# Patient Record
Sex: FEMALE | Race: BLACK OR AFRICAN AMERICAN | NOT HISPANIC OR LATINO
[De-identification: names, ages, dates, MRNs, and addresses within clinical notes are randomized per-mention and may not be internally consistent; named-entity substitution may affect disease eponyms.]

---

## 2018-04-18 ENCOUNTER — LABORATORY RESULT (OUTPATIENT)
Age: 28
End: 2018-04-18

## 2018-04-18 ENCOUNTER — OUTPATIENT (OUTPATIENT)
Dept: OUTPATIENT SERVICES | Facility: HOSPITAL | Age: 28
LOS: 1 days | End: 2018-04-18
Payer: MEDICAID

## 2018-04-18 ENCOUNTER — APPOINTMENT (OUTPATIENT)
Dept: OBGYN | Facility: HOSPITAL | Age: 28
End: 2018-04-18

## 2018-04-18 ENCOUNTER — RESULT REVIEW (OUTPATIENT)
Age: 28
End: 2018-04-18

## 2018-04-18 VITALS
HEIGHT: 63 IN | DIASTOLIC BLOOD PRESSURE: 65 MMHG | SYSTOLIC BLOOD PRESSURE: 101 MMHG | HEART RATE: 67 BPM | BODY MASS INDEX: 24.27 KG/M2 | WEIGHT: 137 LBS

## 2018-04-18 DIAGNOSIS — Z30.09 ENCOUNTER FOR OTHER GENERAL COUNSELING AND ADVICE ON CONTRACEPTION: ICD-10-CM

## 2018-04-18 DIAGNOSIS — Z78.9 OTHER SPECIFIED HEALTH STATUS: ICD-10-CM

## 2018-04-18 DIAGNOSIS — Z86.19 PERSONAL HISTORY OF OTHER INFECTIOUS AND PARASITIC DISEASES: ICD-10-CM

## 2018-04-18 DIAGNOSIS — N76.0 ACUTE VAGINITIS: ICD-10-CM

## 2018-04-18 DIAGNOSIS — Z01.419 ENCOUNTER FOR GYNECOLOGICAL EXAMINATION (GENERAL) (ROUTINE) WITHOUT ABNORMAL FINDINGS: ICD-10-CM

## 2018-04-18 DIAGNOSIS — Z63.5 DISRUPTION OF FAMILY BY SEPARATION AND DIVORCE: ICD-10-CM

## 2018-04-18 DIAGNOSIS — Z83.2 FAMILY HISTORY OF DISEASES OF THE BLOOD AND BLOOD-FORMING ORGANS AND CERTAIN DISORDERS INVOLVING THE IMMUNE MECHANISM: ICD-10-CM

## 2018-04-18 LAB
ALBUMIN SERPL ELPH-MCNC: 4.3 G/DL — SIGNIFICANT CHANGE UP (ref 3.3–5)
ALP SERPL-CCNC: 67 U/L — SIGNIFICANT CHANGE UP (ref 40–120)
ALT FLD-CCNC: 9 U/L — SIGNIFICANT CHANGE UP (ref 4–33)
AST SERPL-CCNC: 13 U/L — SIGNIFICANT CHANGE UP (ref 4–32)
BASOPHILS # BLD AUTO: 0.02 K/UL — SIGNIFICANT CHANGE UP (ref 0–0.2)
BASOPHILS NFR BLD AUTO: 0.4 % — SIGNIFICANT CHANGE UP (ref 0–2)
BILIRUB SERPL-MCNC: < 0.2 MG/DL — LOW (ref 0.2–1.2)
BUN SERPL-MCNC: 15 MG/DL — SIGNIFICANT CHANGE UP (ref 7–23)
CALCIUM SERPL-MCNC: 9.2 MG/DL — SIGNIFICANT CHANGE UP (ref 8.4–10.5)
CHLORIDE SERPL-SCNC: 104 MMOL/L — SIGNIFICANT CHANGE UP (ref 98–107)
CO2 SERPL-SCNC: 26 MMOL/L — SIGNIFICANT CHANGE UP (ref 22–31)
CREAT SERPL-MCNC: 0.65 MG/DL — SIGNIFICANT CHANGE UP (ref 0.5–1.3)
EOSINOPHIL # BLD AUTO: 0.14 K/UL — SIGNIFICANT CHANGE UP (ref 0–0.5)
EOSINOPHIL NFR BLD AUTO: 2.6 % — SIGNIFICANT CHANGE UP (ref 0–6)
GLUCOSE SERPL-MCNC: 86 MG/DL — SIGNIFICANT CHANGE UP (ref 70–99)
HCT VFR BLD CALC: 35.4 % — SIGNIFICANT CHANGE UP (ref 34.5–45)
HGB BLD-MCNC: 11.4 G/DL — LOW (ref 11.5–15.5)
HIV 1+2 AB+HIV1 P24 AG SERPL QL IA: SIGNIFICANT CHANGE UP
IMM GRANULOCYTES # BLD AUTO: 0 # — SIGNIFICANT CHANGE UP
IMM GRANULOCYTES NFR BLD AUTO: 0 % — SIGNIFICANT CHANGE UP (ref 0–1.5)
LYMPHOCYTES # BLD AUTO: 2.5 K/UL — SIGNIFICANT CHANGE UP (ref 1–3.3)
LYMPHOCYTES # BLD AUTO: 46.9 % — HIGH (ref 13–44)
MCHC RBC-ENTMCNC: 28.1 PG — SIGNIFICANT CHANGE UP (ref 27–34)
MCHC RBC-ENTMCNC: 32.2 % — SIGNIFICANT CHANGE UP (ref 32–36)
MCV RBC AUTO: 87.2 FL — SIGNIFICANT CHANGE UP (ref 80–100)
MONOCYTES # BLD AUTO: 0.32 K/UL — SIGNIFICANT CHANGE UP (ref 0–0.9)
MONOCYTES NFR BLD AUTO: 6 % — SIGNIFICANT CHANGE UP (ref 2–14)
NEUTROPHILS # BLD AUTO: 2.35 K/UL — SIGNIFICANT CHANGE UP (ref 1.8–7.4)
NEUTROPHILS NFR BLD AUTO: 44.1 % — SIGNIFICANT CHANGE UP (ref 43–77)
NRBC # FLD: 0 — SIGNIFICANT CHANGE UP
PLATELET # BLD AUTO: 201 K/UL — SIGNIFICANT CHANGE UP (ref 150–400)
PMV BLD: 11.1 FL — SIGNIFICANT CHANGE UP (ref 7–13)
POTASSIUM SERPL-MCNC: 4 MMOL/L — SIGNIFICANT CHANGE UP (ref 3.5–5.3)
POTASSIUM SERPL-SCNC: 4 MMOL/L — SIGNIFICANT CHANGE UP (ref 3.5–5.3)
PROT SERPL-MCNC: 7.6 G/DL — SIGNIFICANT CHANGE UP (ref 6–8.3)
RBC # BLD: 4.06 M/UL — SIGNIFICANT CHANGE UP (ref 3.8–5.2)
RBC # FLD: 12.5 % — SIGNIFICANT CHANGE UP (ref 10.3–14.5)
SODIUM SERPL-SCNC: 141 MMOL/L — SIGNIFICANT CHANGE UP (ref 135–145)
WBC # BLD: 5.33 K/UL — SIGNIFICANT CHANGE UP (ref 3.8–10.5)
WBC # FLD AUTO: 5.33 K/UL — SIGNIFICANT CHANGE UP (ref 3.8–10.5)

## 2018-04-18 PROCEDURE — 88141 CYTOPATH C/V INTERPRET: CPT

## 2018-04-18 SDOH — SOCIAL STABILITY - SOCIAL INSECURITY: DISRUPTION OF FAMILY BY SEPARATION AND DIVORCE: Z63.5

## 2018-04-19 LAB
HBV SURFACE AG SER-ACNC: NONREACTIVE — SIGNIFICANT CHANGE UP
HCV AB S/CO SERPL IA: 0.49 S/CO — SIGNIFICANT CHANGE UP
HCV AB SERPL-IMP: SIGNIFICANT CHANGE UP
T PALLIDUM AB TITR SER: NEGATIVE — SIGNIFICANT CHANGE UP

## 2018-04-20 LAB
C TRACH RRNA SPEC QL NAA+PROBE: SIGNIFICANT CHANGE UP
CANDIDA AB TITR SER: NOT DETECTED — SIGNIFICANT CHANGE UP
G VAGINALIS DNA SPEC QL NAA+PROBE: DETECTED — SIGNIFICANT CHANGE UP
N GONORRHOEA RRNA SPEC QL NAA+PROBE: SIGNIFICANT CHANGE UP
SPECIMEN SOURCE: SIGNIFICANT CHANGE UP
T VAGINALIS SPEC QL WET PREP: NOT DETECTED — SIGNIFICANT CHANGE UP

## 2018-04-23 ENCOUNTER — TRANSCRIPTION ENCOUNTER (OUTPATIENT)
Age: 28
End: 2018-04-23

## 2018-04-24 ENCOUNTER — OUTPATIENT (OUTPATIENT)
Dept: OUTPATIENT SERVICES | Facility: HOSPITAL | Age: 28
LOS: 1 days | End: 2018-04-24

## 2018-04-24 ENCOUNTER — APPOINTMENT (OUTPATIENT)
Dept: OBGYN | Facility: HOSPITAL | Age: 28
End: 2018-04-24

## 2018-04-24 VITALS
DIASTOLIC BLOOD PRESSURE: 74 MMHG | HEART RATE: 83 BPM | WEIGHT: 138 LBS | SYSTOLIC BLOOD PRESSURE: 108 MMHG | HEIGHT: 64 IN | BODY MASS INDEX: 23.56 KG/M2

## 2018-04-24 DIAGNOSIS — L81.9 DISORDER OF PIGMENTATION, UNSPECIFIED: ICD-10-CM

## 2018-04-24 RX ORDER — MEDROXYPROGESTERONE ACETATE 150 MG/ML
150 INJECTION, SUSPENSION INTRAMUSCULAR
Qty: 0 | Refills: 0 | Status: COMPLETED | OUTPATIENT
Start: 2018-04-24

## 2018-04-24 RX ADMIN — MEDROXYPROGESTERONE ACETATE 0 MG/ML: 150 INJECTION, SUSPENSION INTRAMUSCULAR at 00:00

## 2018-04-25 DIAGNOSIS — Z30.42 ENCOUNTER FOR SURVEILLANCE OF INJECTABLE CONTRACEPTIVE: ICD-10-CM

## 2018-04-25 DIAGNOSIS — Z30.46 ENCOUNTER FOR SURVEILLANCE OF IMPLANTABLE SUBDERMAL CONTRACEPTIVE: ICD-10-CM

## 2018-04-25 DIAGNOSIS — L81.9 DISORDER OF PIGMENTATION, UNSPECIFIED: ICD-10-CM

## 2018-05-02 ENCOUNTER — LABORATORY RESULT (OUTPATIENT)
Age: 28
End: 2018-05-02

## 2018-05-02 ENCOUNTER — APPOINTMENT (OUTPATIENT)
Dept: OBGYN | Facility: HOSPITAL | Age: 28
End: 2018-05-02

## 2018-05-02 ENCOUNTER — OUTPATIENT (OUTPATIENT)
Dept: OUTPATIENT SERVICES | Facility: HOSPITAL | Age: 28
LOS: 1 days | End: 2018-05-02

## 2018-05-02 VITALS
DIASTOLIC BLOOD PRESSURE: 78 MMHG | SYSTOLIC BLOOD PRESSURE: 115 MMHG | WEIGHT: 138 LBS | HEART RATE: 78 BPM | HEIGHT: 64 IN | BODY MASS INDEX: 23.56 KG/M2

## 2018-05-02 DIAGNOSIS — L29.8 OTHER PRURITUS: ICD-10-CM

## 2018-05-03 ENCOUNTER — RESULT CHARGE (OUTPATIENT)
Age: 28
End: 2018-05-03

## 2018-05-03 ENCOUNTER — RESULT REVIEW (OUTPATIENT)
Age: 28
End: 2018-05-03

## 2018-05-03 ENCOUNTER — OUTPATIENT (OUTPATIENT)
Dept: OUTPATIENT SERVICES | Facility: HOSPITAL | Age: 28
LOS: 1 days | End: 2018-05-03
Payer: MEDICAID

## 2018-05-03 ENCOUNTER — APPOINTMENT (OUTPATIENT)
Dept: OBGYN | Facility: HOSPITAL | Age: 28
End: 2018-05-03
Payer: MEDICAID

## 2018-05-03 VITALS
HEIGHT: 64 IN | WEIGHT: 137.35 LBS | SYSTOLIC BLOOD PRESSURE: 112 MMHG | DIASTOLIC BLOOD PRESSURE: 72 MMHG | BODY MASS INDEX: 23.45 KG/M2 | HEART RATE: 68 BPM

## 2018-05-03 DIAGNOSIS — N87.9 DYSPLASIA OF CERVIX UTERI, UNSPECIFIED: ICD-10-CM

## 2018-05-03 DIAGNOSIS — Z30.46 ENCOUNTER FOR SURVEILLANCE OF IMPLANTABLE SUBDERMAL CONTRACEPTIVE: ICD-10-CM

## 2018-05-03 DIAGNOSIS — L29.8 OTHER PRURITUS: ICD-10-CM

## 2018-05-03 LAB
HCG UR QL: NEGATIVE
HCG UR QL: NEGATIVE
QUALITY CONTROL: YES
QUALITY CONTROL: YES

## 2018-05-03 PROCEDURE — 88305 TISSUE EXAM BY PATHOLOGIST: CPT | Mod: 26

## 2018-05-03 PROCEDURE — 57454 BX/CURETT OF CERVIX W/SCOPE: CPT | Mod: GC

## 2018-05-04 DIAGNOSIS — R87.610 ATYPICAL SQUAMOUS CELLS OF UNDETERMINED SIGNIFICANCE ON CYTOLOGIC SMEAR OF CERVIX (ASC-US): ICD-10-CM

## 2018-05-04 LAB
BACTERIA UR CULT: SIGNIFICANT CHANGE UP
SPECIMEN SOURCE: SIGNIFICANT CHANGE UP

## 2018-07-11 ENCOUNTER — OUTPATIENT (OUTPATIENT)
Dept: OUTPATIENT SERVICES | Facility: HOSPITAL | Age: 28
LOS: 1 days | End: 2018-07-11

## 2018-07-11 ENCOUNTER — APPOINTMENT (OUTPATIENT)
Dept: OBGYN | Facility: HOSPITAL | Age: 28
End: 2018-07-11

## 2018-07-11 VITALS
BODY MASS INDEX: 23.86 KG/M2 | SYSTOLIC BLOOD PRESSURE: 107 MMHG | WEIGHT: 139 LBS | DIASTOLIC BLOOD PRESSURE: 72 MMHG | HEART RATE: 64 BPM

## 2018-07-11 RX ORDER — MEDROXYPROGESTERONE ACETATE 150 MG/ML
150 INJECTION, SUSPENSION INTRAMUSCULAR
Qty: 0 | Refills: 0 | Status: COMPLETED | OUTPATIENT
Start: 2018-07-11

## 2018-07-11 RX ADMIN — MEDROXYPROGESTERONE ACETATE 0 MG/ML: 150 INJECTION, SUSPENSION INTRAMUSCULAR at 00:00

## 2018-07-12 DIAGNOSIS — Z30.42 ENCOUNTER FOR SURVEILLANCE OF INJECTABLE CONTRACEPTIVE: ICD-10-CM

## 2018-09-21 ENCOUNTER — APPOINTMENT (OUTPATIENT)
Dept: DERMATOLOGY | Facility: CLINIC | Age: 28
End: 2018-09-21
Payer: MEDICAID

## 2018-09-21 VITALS
DIASTOLIC BLOOD PRESSURE: 76 MMHG | BODY MASS INDEX: 23.22 KG/M2 | WEIGHT: 136 LBS | HEIGHT: 64 IN | SYSTOLIC BLOOD PRESSURE: 108 MMHG

## 2018-09-21 DIAGNOSIS — Z91.89 OTHER SPECIFIED PERSONAL RISK FACTORS, NOT ELSEWHERE CLASSIFIED: ICD-10-CM

## 2018-09-21 DIAGNOSIS — Z87.09 PERSONAL HISTORY OF OTHER DISEASES OF THE RESPIRATORY SYSTEM: ICD-10-CM

## 2018-09-21 PROCEDURE — 99203 OFFICE O/P NEW LOW 30 MIN: CPT

## 2018-09-26 ENCOUNTER — APPOINTMENT (OUTPATIENT)
Dept: OBGYN | Facility: HOSPITAL | Age: 28
End: 2018-09-26

## 2018-09-26 ENCOUNTER — OUTPATIENT (OUTPATIENT)
Dept: OUTPATIENT SERVICES | Facility: HOSPITAL | Age: 28
LOS: 1 days | End: 2018-09-26

## 2018-09-26 ENCOUNTER — MED ADMIN CHARGE (OUTPATIENT)
Age: 28
End: 2018-09-26

## 2018-09-26 VITALS
WEIGHT: 140 LBS | DIASTOLIC BLOOD PRESSURE: 74 MMHG | HEART RATE: 74 BPM | BODY MASS INDEX: 23.9 KG/M2 | SYSTOLIC BLOOD PRESSURE: 108 MMHG | HEIGHT: 64 IN

## 2018-09-26 DIAGNOSIS — Z30.46 ENCOUNTER FOR SURVEILLANCE OF IMPLANTABLE SUBDERMAL CONTRACEPTIVE: ICD-10-CM

## 2018-09-26 RX ORDER — METRONIDAZOLE 7.5 MG/G
0.75 GEL VAGINAL
Qty: 1 | Refills: 0 | Status: COMPLETED | COMMUNITY
Start: 2018-04-18 | End: 2018-04-26

## 2018-09-26 RX ORDER — MEDROXYPROGESTERONE ACETATE 150 MG/ML
150 INJECTION, SUSPENSION INTRAMUSCULAR
Qty: 0 | Refills: 0 | Status: COMPLETED | OUTPATIENT
Start: 2018-09-26

## 2018-09-26 RX ADMIN — MEDROXYPROGESTERONE ACETATE 0 MG/ML: 150 INJECTION, SUSPENSION INTRAMUSCULAR at 00:00

## 2018-09-27 DIAGNOSIS — Z30.42 ENCOUNTER FOR SURVEILLANCE OF INJECTABLE CONTRACEPTIVE: ICD-10-CM

## 2018-10-26 ENCOUNTER — APPOINTMENT (OUTPATIENT)
Dept: DERMATOLOGY | Facility: CLINIC | Age: 28
End: 2018-10-26

## 2018-11-09 ENCOUNTER — APPOINTMENT (OUTPATIENT)
Dept: DERMATOLOGY | Facility: CLINIC | Age: 28
End: 2018-11-09
Payer: MEDICAID

## 2018-11-09 VITALS — DIASTOLIC BLOOD PRESSURE: 64 MMHG | SYSTOLIC BLOOD PRESSURE: 98 MMHG

## 2018-11-09 PROCEDURE — 99213 OFFICE O/P EST LOW 20 MIN: CPT | Mod: GC

## 2018-12-20 ENCOUNTER — APPOINTMENT (OUTPATIENT)
Dept: OBGYN | Facility: HOSPITAL | Age: 28
End: 2018-12-20

## 2019-08-14 ENCOUNTER — APPOINTMENT (OUTPATIENT)
Dept: OBGYN | Facility: HOSPITAL | Age: 29
End: 2019-08-14

## 2019-08-14 ENCOUNTER — OUTPATIENT (OUTPATIENT)
Dept: OUTPATIENT SERVICES | Facility: HOSPITAL | Age: 29
LOS: 1 days | End: 2019-08-14

## 2019-08-14 VITALS
BODY MASS INDEX: 24.09 KG/M2 | HEIGHT: 64 IN | SYSTOLIC BLOOD PRESSURE: 103 MMHG | HEART RATE: 82 BPM | DIASTOLIC BLOOD PRESSURE: 56 MMHG | WEIGHT: 141.1 LBS

## 2019-08-14 LAB
HCG UR QL: POSITIVE
QUALITY CONTROL: YES

## 2019-08-16 DIAGNOSIS — Z32.01 ENCOUNTER FOR PREGNANCY TEST, RESULT POSITIVE: ICD-10-CM

## 2019-08-16 DIAGNOSIS — Z00.00 ENCOUNTER FOR GENERAL ADULT MEDICAL EXAMINATION WITHOUT ABNORMAL FINDINGS: ICD-10-CM

## 2019-09-05 ENCOUNTER — APPOINTMENT (OUTPATIENT)
Dept: OBGYN | Facility: HOSPITAL | Age: 29
End: 2019-09-05

## 2020-03-05 ENCOUNTER — APPOINTMENT (OUTPATIENT)
Dept: OBGYN | Facility: HOSPITAL | Age: 30
End: 2020-03-05

## 2020-03-05 ENCOUNTER — OUTPATIENT (OUTPATIENT)
Dept: OUTPATIENT SERVICES | Facility: HOSPITAL | Age: 30
LOS: 1 days | End: 2020-03-05

## 2020-03-05 ENCOUNTER — LABORATORY RESULT (OUTPATIENT)
Age: 30
End: 2020-03-05

## 2020-03-05 VITALS
DIASTOLIC BLOOD PRESSURE: 75 MMHG | HEIGHT: 64 IN | WEIGHT: 148 LBS | SYSTOLIC BLOOD PRESSURE: 119 MMHG | BODY MASS INDEX: 25.27 KG/M2 | HEART RATE: 75 BPM

## 2020-03-05 DIAGNOSIS — N89.8 OTHER SPECIFIED NONINFLAMMATORY DISORDERS OF VAGINA: ICD-10-CM

## 2020-03-05 LAB
HCG UR QL: NEGATIVE
QUALITY CONTROL: YES

## 2020-03-05 RX ORDER — MEDROXYPROGESTERONE ACETATE 150 MG/ML
150 INJECTION, SUSPENSION INTRAMUSCULAR
Qty: 0 | Refills: 0 | Status: COMPLETED | OUTPATIENT
Start: 2020-03-05

## 2020-03-05 RX ADMIN — MEDROXYPROGESTERONE ACETATE 0 MG/ML: 150 INJECTION, SUSPENSION, EXTENDED RELEASE INTRAMUSCULAR at 00:00

## 2020-03-05 NOTE — PHYSICAL EXAM
Discussed lid hygiene, warm compress and eyelid wash. [Awake] : awake [Alert] : alert [Acute Distress] : no acute distress [Mass] : no breast mass [Nipple Discharge] : no nipple discharge [Axillary LAD] : no axillary lymphadenopathy [Soft] : soft [Tender] : non tender [Oriented x3] : oriented to person, place, and time [Normal] : uterus [No Bleeding] : there was no active vaginal bleeding [Uterine Adnexae] : were not tender and not enlarged

## 2020-03-09 DIAGNOSIS — N89.8 OTHER SPECIFIED NONINFLAMMATORY DISORDERS OF VAGINA: ICD-10-CM

## 2020-03-09 DIAGNOSIS — Z30.42 ENCOUNTER FOR SURVEILLANCE OF INJECTABLE CONTRACEPTIVE: ICD-10-CM

## 2020-03-16 DIAGNOSIS — N76.0 ACUTE VAGINITIS: ICD-10-CM

## 2020-03-16 DIAGNOSIS — B96.89 ACUTE VAGINITIS: ICD-10-CM

## 2020-03-16 RX ORDER — METRONIDAZOLE 500 MG/1
500 TABLET ORAL TWICE DAILY
Qty: 14 | Refills: 0 | Status: ACTIVE | COMMUNITY
Start: 2020-03-16 | End: 1900-01-01

## 2020-06-02 ENCOUNTER — APPOINTMENT (OUTPATIENT)
Dept: OBGYN | Facility: HOSPITAL | Age: 30
End: 2020-06-02

## 2020-07-28 ENCOUNTER — OUTPATIENT (OUTPATIENT)
Dept: OUTPATIENT SERVICES | Facility: HOSPITAL | Age: 30
LOS: 1 days | End: 2020-07-28

## 2020-07-28 ENCOUNTER — APPOINTMENT (OUTPATIENT)
Dept: OBGYN | Facility: HOSPITAL | Age: 30
End: 2020-07-28

## 2020-07-28 VITALS
HEART RATE: 71 BPM | SYSTOLIC BLOOD PRESSURE: 127 MMHG | HEIGHT: 64 IN | DIASTOLIC BLOOD PRESSURE: 83 MMHG | TEMPERATURE: 98.8 F | WEIGHT: 146 LBS | BODY MASS INDEX: 24.92 KG/M2

## 2020-07-28 LAB
HCG UR QL: NEGATIVE
QUALITY CONTROL: YES

## 2020-07-28 RX ORDER — MEDROXYPROGESTERONE ACETATE 150 MG/ML
150 INJECTION, SUSPENSION INTRAMUSCULAR
Qty: 0 | Refills: 0 | Status: COMPLETED | OUTPATIENT
Start: 2020-07-28

## 2020-07-28 RX ADMIN — MEDROXYPROGESTERONE ACETATE 0 MG/ML: 150 INJECTION, SUSPENSION INTRAMUSCULAR at 00:00

## 2020-07-31 DIAGNOSIS — Z30.42 ENCOUNTER FOR SURVEILLANCE OF INJECTABLE CONTRACEPTIVE: ICD-10-CM

## 2020-10-13 ENCOUNTER — OUTPATIENT (OUTPATIENT)
Dept: OUTPATIENT SERVICES | Facility: HOSPITAL | Age: 30
LOS: 1 days | End: 2020-10-13

## 2020-10-13 ENCOUNTER — APPOINTMENT (OUTPATIENT)
Dept: OBGYN | Facility: HOSPITAL | Age: 30
End: 2020-10-13

## 2020-10-13 VITALS
SYSTOLIC BLOOD PRESSURE: 127 MMHG | BODY MASS INDEX: 25.1 KG/M2 | HEART RATE: 66 BPM | HEIGHT: 64 IN | TEMPERATURE: 98.7 F | DIASTOLIC BLOOD PRESSURE: 74 MMHG | WEIGHT: 147 LBS

## 2020-10-13 RX ORDER — MEDROXYPROGESTERONE ACETATE 150 MG/ML
150 INJECTION, SUSPENSION INTRAMUSCULAR
Qty: 0 | Refills: 0 | Status: COMPLETED | OUTPATIENT
Start: 2020-10-13

## 2020-10-13 RX ADMIN — MEDROXYPROGESTERONE ACETATE 0 MG/ML: 150 INJECTION, SUSPENSION INTRAMUSCULAR at 00:00

## 2020-10-13 NOTE — DISCUSSION/SUMMARY
[FreeTextEntry1] : 29 yo  presents for depo provera.\par \par 1. Depo\par - would like to try depo once more before deciding to switch\par -if still bleeding would consider nexplanon\par -if bleeding resolves should return 12/29-1/12/21 for next injection.

## 2020-10-13 NOTE — HISTORY OF PRESENT ILLNESS
[FreeTextEntry1] : 29 yo  presents for depo provera. She reports she still has irregular bleedinh but it is very light. She would like to try depo once more and then transition to nexplanon if she is still having the spotting. She is on time today. \par She reports significant worsening of her cystic acne since wearing a mask daily and is also requesting a referral to a dermatologist.

## 2020-10-14 DIAGNOSIS — L70.0 ACNE VULGARIS: ICD-10-CM

## 2020-10-14 DIAGNOSIS — Z30.42 ENCOUNTER FOR SURVEILLANCE OF INJECTABLE CONTRACEPTIVE: ICD-10-CM

## 2020-12-23 PROBLEM — N76.0 BACTERIAL VAGINOSIS: Status: RESOLVED | Noted: 2018-04-18 | Resolved: 2020-12-23

## 2020-12-29 ENCOUNTER — APPOINTMENT (OUTPATIENT)
Dept: DERMATOLOGY | Facility: CLINIC | Age: 30
End: 2020-12-29
Payer: MEDICAID

## 2020-12-29 DIAGNOSIS — B36.0 PITYRIASIS VERSICOLOR: ICD-10-CM

## 2020-12-29 DIAGNOSIS — L70.0 ACNE VULGARIS: ICD-10-CM

## 2020-12-29 PROCEDURE — 99214 OFFICE O/P EST MOD 30 MIN: CPT

## 2020-12-29 PROCEDURE — 99072 ADDL SUPL MATRL&STAF TM PHE: CPT

## 2020-12-29 RX ORDER — KETOCONAZOLE 20.5 MG/ML
2 SHAMPOO, SUSPENSION TOPICAL
Qty: 1 | Refills: 7 | Status: ACTIVE | COMMUNITY
Start: 2018-09-21 | End: 1900-01-01

## 2020-12-29 RX ORDER — SPIRONOLACTONE 50 MG/1
50 TABLET ORAL
Qty: 1 | Refills: 2 | Status: ACTIVE | COMMUNITY
Start: 2020-12-29 | End: 1900-01-01

## 2020-12-29 RX ORDER — BENZOYL PEROXIDE 5 G/100G
5 LIQUID TOPICAL DAILY
Qty: 1 | Refills: 7 | Status: DISCONTINUED | COMMUNITY
Start: 2018-09-21 | End: 2020-12-29

## 2020-12-29 RX ORDER — KETOCONAZOLE 20 MG/G
2 CREAM TOPICAL
Qty: 1 | Refills: 1 | Status: DISCONTINUED | COMMUNITY
Start: 2018-09-21 | End: 2020-12-29

## 2020-12-29 RX ORDER — CLINDAMYCIN PHOSPHATE 10 MG/ML
1 LOTION TOPICAL
Qty: 1 | Refills: 3 | Status: DISCONTINUED | COMMUNITY
Start: 2018-09-21 | End: 2020-12-29

## 2020-12-29 RX ORDER — TRETINOIN 0.25 MG/G
0.03 CREAM TOPICAL
Qty: 1 | Refills: 2 | Status: ACTIVE | COMMUNITY
Start: 2018-09-21 | End: 1900-01-01

## 2021-01-05 ENCOUNTER — OUTPATIENT (OUTPATIENT)
Dept: OUTPATIENT SERVICES | Facility: HOSPITAL | Age: 31
LOS: 1 days | End: 2021-01-05

## 2021-01-05 ENCOUNTER — APPOINTMENT (OUTPATIENT)
Dept: OBGYN | Facility: HOSPITAL | Age: 31
End: 2021-01-05

## 2021-01-05 VITALS
HEIGHT: 64 IN | DIASTOLIC BLOOD PRESSURE: 75 MMHG | TEMPERATURE: 97.8 F | BODY MASS INDEX: 25.1 KG/M2 | HEART RATE: 96 BPM | WEIGHT: 147 LBS | SYSTOLIC BLOOD PRESSURE: 100 MMHG

## 2021-01-05 RX ORDER — MEDROXYPROGESTERONE ACETATE 150 MG/ML
150 INJECTION, SUSPENSION INTRAMUSCULAR
Qty: 0 | Refills: 0 | Status: COMPLETED | OUTPATIENT
Start: 2021-01-05

## 2021-01-05 RX ADMIN — MEDROXYPROGESTERONE ACETATE 0 MG/ML: 150 INJECTION, SUSPENSION INTRAMUSCULAR at 00:00

## 2021-01-05 NOTE — DISCUSSION/SUMMARY
[FreeTextEntry1] : 31 yo presents for depo provera with c/o irregular bleeding. \par \par 1. Depo\par -can get next shot at 10 weeks to see if this improves late in window bleeding\par -if still an issue at next visit pt states she will get a nexplanon\par -to reeval in march\par \par RTC 3/15-4/6

## 2021-01-05 NOTE — HISTORY OF PRESENT ILLNESS
[FreeTextEntry1] : 31 yo presnts for depo provera. She states she is still considering going back to the nexplanon as she often has bleeding at the end of her depo window. Today she is at 12 weeks since her last injection. She did go to see the dermatologist regarding her acne and states it is already improving after starting spironolactone 50 mg.

## 2021-01-11 DIAGNOSIS — Z30.42 ENCOUNTER FOR SURVEILLANCE OF INJECTABLE CONTRACEPTIVE: ICD-10-CM

## 2021-03-23 ENCOUNTER — APPOINTMENT (OUTPATIENT)
Dept: OBGYN | Facility: HOSPITAL | Age: 31
End: 2021-03-23

## 2021-04-08 ENCOUNTER — OUTPATIENT (OUTPATIENT)
Dept: OUTPATIENT SERVICES | Facility: HOSPITAL | Age: 31
LOS: 1 days | End: 2021-04-08

## 2021-04-08 ENCOUNTER — MED ADMIN CHARGE (OUTPATIENT)
Age: 31
End: 2021-04-08

## 2021-04-08 ENCOUNTER — APPOINTMENT (OUTPATIENT)
Dept: OBGYN | Facility: HOSPITAL | Age: 31
End: 2021-04-08

## 2021-04-08 VITALS
SYSTOLIC BLOOD PRESSURE: 110 MMHG | BODY MASS INDEX: 25.78 KG/M2 | HEIGHT: 64 IN | HEART RATE: 87 BPM | TEMPERATURE: 98.5 F | DIASTOLIC BLOOD PRESSURE: 64 MMHG | WEIGHT: 151 LBS

## 2021-04-08 DIAGNOSIS — Z32.01 ENCOUNTER FOR PREGNANCY TEST, RESULT POSITIVE: ICD-10-CM

## 2021-04-08 LAB
HCG UR QL: NEGATIVE
QUALITY CONTROL: YES

## 2021-04-08 RX ORDER — MEDROXYPROGESTERONE ACETATE 150 MG/ML
150 INJECTION, SUSPENSION INTRAMUSCULAR
Qty: 0 | Refills: 0 | Status: COMPLETED | OUTPATIENT
Start: 2021-04-08

## 2021-04-08 RX ADMIN — MEDROXYPROGESTERONE ACETATE 0 MG/ML: 150 INJECTION, SUSPENSION INTRAMUSCULAR at 00:00

## 2021-04-08 NOTE — HISTORY OF PRESENT ILLNESS
[FreeTextEntry1] : Patient here for Depo Provera.  Is one day late.  UCG negative today.  Would like to continue Depo.  Was planning to come after 10 weeks to prevent spotting but she missed appointment.  Patient states she just started spotting a few days ago.  Is due for annual exam but declines this visit.  States she will have next time she comes for Depo Provera

## 2021-04-08 NOTE — DISCUSSION/SUMMARY
[FreeTextEntry1] : Depo Provera given.  Risks and instructions reviewed.  RTC 6/17/21 for next Depo (10 weeks) and annual exam

## 2021-04-12 DIAGNOSIS — Z30.09 ENCOUNTER FOR OTHER GENERAL COUNSELING AND ADVICE ON CONTRACEPTION: ICD-10-CM

## 2021-04-12 DIAGNOSIS — Z30.42 ENCOUNTER FOR SURVEILLANCE OF INJECTABLE CONTRACEPTIVE: ICD-10-CM

## 2021-04-23 ENCOUNTER — APPOINTMENT (OUTPATIENT)
Dept: DERMATOLOGY | Facility: CLINIC | Age: 31
End: 2021-04-23

## 2021-06-17 ENCOUNTER — OUTPATIENT (OUTPATIENT)
Dept: OUTPATIENT SERVICES | Facility: HOSPITAL | Age: 31
LOS: 1 days | End: 2021-06-17

## 2021-06-17 ENCOUNTER — APPOINTMENT (OUTPATIENT)
Dept: OBGYN | Facility: HOSPITAL | Age: 31
End: 2021-06-17

## 2021-06-17 VITALS
HEART RATE: 83 BPM | WEIGHT: 155 LBS | SYSTOLIC BLOOD PRESSURE: 125 MMHG | HEIGHT: 64 IN | BODY MASS INDEX: 26.46 KG/M2 | TEMPERATURE: 98.4 F | DIASTOLIC BLOOD PRESSURE: 88 MMHG

## 2021-06-17 RX ORDER — MEDROXYPROGESTERONE ACETATE 150 MG/ML
150 INJECTION, SUSPENSION INTRAMUSCULAR
Qty: 0 | Refills: 0 | Status: COMPLETED | OUTPATIENT
Start: 2021-06-17

## 2021-06-17 RX ADMIN — MEDROXYPROGESTERONE ACETATE 0 MG/ML: 150 INJECTION, SUSPENSION INTRAMUSCULAR at 00:00

## 2021-06-17 NOTE — DISCUSSION/SUMMARY
[FreeTextEntry1] : 30 yo presents for depo provera without complaint. \par \par 1. Depo\par -doing well, happy with method\par -on time today 10 weeks, can receive future doses at 11 weeks if no issues w bleeding\par -given today\par -for annual exam next visit\par \par RTC 8/26-9/17

## 2021-06-17 NOTE — HISTORY OF PRESENT ILLNESS
[FreeTextEntry1] : 32 yo presents for depo provera without complaint. Is now amenorrheic with depo and is pleased. She is due for annual exam today however is requesting to do next time.

## 2021-06-22 DIAGNOSIS — Z30.42 ENCOUNTER FOR SURVEILLANCE OF INJECTABLE CONTRACEPTIVE: ICD-10-CM

## 2021-08-26 ENCOUNTER — OUTPATIENT (OUTPATIENT)
Dept: OUTPATIENT SERVICES | Facility: HOSPITAL | Age: 31
LOS: 1 days | End: 2021-08-26
Payer: MEDICAID

## 2021-08-26 ENCOUNTER — RESULT REVIEW (OUTPATIENT)
Age: 31
End: 2021-08-26

## 2021-08-26 ENCOUNTER — APPOINTMENT (OUTPATIENT)
Dept: OBGYN | Facility: HOSPITAL | Age: 31
End: 2021-08-26

## 2021-08-26 VITALS
HEIGHT: 64 IN | DIASTOLIC BLOOD PRESSURE: 80 MMHG | BODY MASS INDEX: 26.63 KG/M2 | SYSTOLIC BLOOD PRESSURE: 118 MMHG | WEIGHT: 156 LBS | HEART RATE: 80 BPM | TEMPERATURE: 97.6 F

## 2021-08-26 DIAGNOSIS — Z01.419 ENCOUNTER FOR GYNECOLOGICAL EXAMINATION (GENERAL) (ROUTINE) W/OUT ABNORMAL FINDINGS: ICD-10-CM

## 2021-08-26 LAB — HIV 1+2 AB+HIV1 P24 AG SERPL QL IA: SIGNIFICANT CHANGE UP

## 2021-08-26 PROCEDURE — 88141 CYTOPATH C/V INTERPRET: CPT

## 2021-08-26 RX ORDER — MEDROXYPROGESTERONE ACETATE 150 MG/ML
150 INJECTION, SUSPENSION INTRAMUSCULAR
Qty: 0 | Refills: 0 | Status: COMPLETED | OUTPATIENT
Start: 2021-08-26

## 2021-08-26 RX ADMIN — MEDROXYPROGESTERONE ACETATE 0 MG/ML: 150 INJECTION, SUSPENSION INTRAMUSCULAR at 00:00

## 2021-08-26 NOTE — DISCUSSION/SUMMARY
[FreeTextEntry1] : 32 yo  LMP presents for GYN annual and depo provera without complaint. \par \par 1. GYN annual\par -pap with HPV today\par -routine labs today\par -STI testing\par \par 2. Depo\par -on time today and happy with method, would like to continue\par -given today\par \par RTC 11/10-11/23

## 2021-08-26 NOTE — HISTORY OF PRESENT ILLNESS
[FreeTextEntry1] : 30 yo  presents for GYN annual and depo provera without complaint. She is doing well on depo, no issues and is on time today. \par Sexually active with one monogamous male partner. \par Last pap 3/2018 ASCUS, colpo with one biopsy and ECC which were negative. Has not had pap since\par Of note her maternal aunt was recently diagnosed with breast cancer and her mother is going to have a biopsy of her breast.

## 2021-08-27 DIAGNOSIS — Z01.419 ENCOUNTER FOR GYNECOLOGICAL EXAMINATION (GENERAL) (ROUTINE) WITHOUT ABNORMAL FINDINGS: ICD-10-CM

## 2021-08-27 DIAGNOSIS — Z30.42 ENCOUNTER FOR SURVEILLANCE OF INJECTABLE CONTRACEPTIVE: ICD-10-CM

## 2021-08-27 LAB
C TRACH RRNA SPEC QL NAA+PROBE: SIGNIFICANT CHANGE UP
HBV SURFACE AG SER-ACNC: SIGNIFICANT CHANGE UP
HCV AB S/CO SERPL IA: 0.3 S/CO — SIGNIFICANT CHANGE UP (ref 0–0.99)
HCV AB SERPL-IMP: SIGNIFICANT CHANGE UP
HPV GENOTYPE 16: SIGNIFICANT CHANGE UP
HPV GENOTYPE 18/45: SIGNIFICANT CHANGE UP
HPV HIGH+LOW RISK DNA PNL CVX: DETECTED
N GONORRHOEA RRNA SPEC QL NAA+PROBE: SIGNIFICANT CHANGE UP
SPECIMEN SOURCE: SIGNIFICANT CHANGE UP
T PALLIDUM AB TITR SER: NEGATIVE — SIGNIFICANT CHANGE UP

## 2021-08-31 LAB — CYTOLOGY SPEC DOC CYTO: SIGNIFICANT CHANGE UP

## 2021-09-01 ENCOUNTER — NON-APPOINTMENT (OUTPATIENT)
Age: 31
End: 2021-09-01

## 2021-09-02 ENCOUNTER — NON-APPOINTMENT (OUTPATIENT)
Age: 31
End: 2021-09-02

## 2021-11-11 ENCOUNTER — OUTPATIENT (OUTPATIENT)
Dept: OUTPATIENT SERVICES | Facility: HOSPITAL | Age: 31
LOS: 1 days | End: 2021-11-11

## 2021-11-11 ENCOUNTER — APPOINTMENT (OUTPATIENT)
Dept: OBGYN | Facility: HOSPITAL | Age: 31
End: 2021-11-11

## 2021-11-11 VITALS
HEIGHT: 64 IN | HEART RATE: 75 BPM | SYSTOLIC BLOOD PRESSURE: 122 MMHG | BODY MASS INDEX: 26.63 KG/M2 | DIASTOLIC BLOOD PRESSURE: 80 MMHG | WEIGHT: 156 LBS | TEMPERATURE: 97.7 F

## 2021-11-11 DIAGNOSIS — Z30.42 ENCOUNTER FOR SURVEILLANCE OF INJECTABLE CONTRACEPTIVE: ICD-10-CM

## 2021-11-11 RX ORDER — MEDROXYPROGESTERONE ACETATE 150 MG/ML
150 INJECTION, SUSPENSION INTRAMUSCULAR
Qty: 0 | Refills: 0 | Status: COMPLETED | OUTPATIENT
Start: 2021-11-11

## 2021-11-11 RX ADMIN — MEDROXYPROGESTERONE ACETATE 0 MG/ML: 150 INJECTION, SUSPENSION INTRAMUSCULAR at 00:00

## 2021-11-18 ENCOUNTER — APPOINTMENT (OUTPATIENT)
Dept: OBGYN | Facility: HOSPITAL | Age: 31
End: 2021-11-18
Payer: MEDICAID

## 2021-11-18 ENCOUNTER — RESULT REVIEW (OUTPATIENT)
Age: 31
End: 2021-11-18

## 2021-11-18 ENCOUNTER — OUTPATIENT (OUTPATIENT)
Dept: OUTPATIENT SERVICES | Facility: HOSPITAL | Age: 31
LOS: 1 days | End: 2021-11-18
Payer: MEDICAID

## 2021-11-18 VITALS
WEIGHT: 155 LBS | DIASTOLIC BLOOD PRESSURE: 85 MMHG | BODY MASS INDEX: 26.46 KG/M2 | HEIGHT: 64 IN | TEMPERATURE: 97.7 F | HEART RATE: 79 BPM | SYSTOLIC BLOOD PRESSURE: 128 MMHG

## 2021-11-18 DIAGNOSIS — Z00.00 ENCOUNTER FOR GENERAL ADULT MEDICAL EXAMINATION W/OUT ABNORMAL FINDINGS: ICD-10-CM

## 2021-11-18 DIAGNOSIS — R87.619 UNSPECIFIED ABNORMAL CYTOLOGICAL FINDINGS IN SPECIMENS FROM CERVIX UTERI: ICD-10-CM

## 2021-11-18 LAB
HCG UR QL: NEGATIVE
QUALITY CONTROL: YES

## 2021-11-18 PROCEDURE — 88305 TISSUE EXAM BY PATHOLOGIST: CPT | Mod: 26

## 2021-11-18 PROCEDURE — 57456 ENDOCERV CURETTAGE W/SCOPE: CPT | Mod: GC

## 2021-11-24 LAB — SURGICAL PATHOLOGY STUDY: SIGNIFICANT CHANGE UP

## 2021-12-09 NOTE — ASSESSMENT
[FreeTextEntry1] : 32 y/o  presents for colposcopy for LSIL and HPV + on pap smear 8/26 previous negative colposcopy in 2018 (when pap smear was ASCUS)\par - No suspicious lesions noted\par - Follow up ECC \par - IF normal return for repeat pap smear one year\par \par KASIA Smith PGY4\par seen and d/w Dr. Nicole

## 2022-02-03 ENCOUNTER — APPOINTMENT (OUTPATIENT)
Dept: OBGYN | Facility: HOSPITAL | Age: 32
End: 2022-02-03

## 2022-02-03 ENCOUNTER — OUTPATIENT (OUTPATIENT)
Dept: OUTPATIENT SERVICES | Facility: HOSPITAL | Age: 32
LOS: 1 days | End: 2022-02-03

## 2022-02-03 VITALS
WEIGHT: 153 LBS | DIASTOLIC BLOOD PRESSURE: 85 MMHG | BODY MASS INDEX: 26.12 KG/M2 | HEART RATE: 79 BPM | HEIGHT: 64 IN | TEMPERATURE: 97.7 F | SYSTOLIC BLOOD PRESSURE: 126 MMHG

## 2022-02-03 DIAGNOSIS — Z30.42 ENCOUNTER FOR SURVEILLANCE OF INJECTABLE CONTRACEPTIVE: ICD-10-CM

## 2022-02-03 RX ORDER — MEDROXYPROGESTERONE ACETATE 150 MG/ML
150 INJECTION, SUSPENSION INTRAMUSCULAR
Qty: 0 | Refills: 0 | Status: COMPLETED | OUTPATIENT
Start: 2022-02-03

## 2022-02-03 RX ADMIN — MEDROXYPROGESTERONE ACETATE 0 MG/ML: 150 INJECTION, SUSPENSION INTRAMUSCULAR at 00:00

## 2022-02-07 DIAGNOSIS — Z30.42 ENCOUNTER FOR SURVEILLANCE OF INJECTABLE CONTRACEPTIVE: ICD-10-CM

## 2022-04-28 ENCOUNTER — APPOINTMENT (OUTPATIENT)
Dept: OBGYN | Facility: HOSPITAL | Age: 32
End: 2022-04-28

## 2022-05-10 ENCOUNTER — APPOINTMENT (OUTPATIENT)
Dept: OBGYN | Facility: HOSPITAL | Age: 32
End: 2022-05-10

## 2022-05-10 ENCOUNTER — RESULT CHARGE (OUTPATIENT)
Age: 32
End: 2022-05-10

## 2022-05-10 ENCOUNTER — OUTPATIENT (OUTPATIENT)
Dept: OUTPATIENT SERVICES | Facility: HOSPITAL | Age: 32
LOS: 1 days | End: 2022-05-10

## 2022-05-10 VITALS
WEIGHT: 155.4 LBS | BODY MASS INDEX: 26.53 KG/M2 | TEMPERATURE: 97.7 F | HEIGHT: 64 IN | HEART RATE: 99 BPM | DIASTOLIC BLOOD PRESSURE: 87 MMHG | SYSTOLIC BLOOD PRESSURE: 126 MMHG

## 2022-05-10 RX ORDER — MEDROXYPROGESTERONE ACETATE 150 MG/ML
150 INJECTION, SUSPENSION INTRAMUSCULAR
Qty: 0 | Refills: 0 | Status: COMPLETED | OUTPATIENT
Start: 2022-05-10

## 2022-05-11 DIAGNOSIS — Z00.00 ENCOUNTER FOR GENERAL ADULT MEDICAL EXAMINATION WITHOUT ABNORMAL FINDINGS: ICD-10-CM
